# Patient Record
Sex: MALE | Race: BLACK OR AFRICAN AMERICAN | NOT HISPANIC OR LATINO | Employment: FULL TIME | URBAN - METROPOLITAN AREA
[De-identification: names, ages, dates, MRNs, and addresses within clinical notes are randomized per-mention and may not be internally consistent; named-entity substitution may affect disease eponyms.]

---

## 2021-04-12 ENCOUNTER — PATIENT MESSAGE (OUTPATIENT)
Dept: RESEARCH | Facility: HOSPITAL | Age: 41
End: 2021-04-12

## 2021-04-26 DIAGNOSIS — R20.2 PARESTHESIA OF RIGHT UPPER LIMB: Primary | ICD-10-CM

## 2021-05-12 ENCOUNTER — DOCUMENTATION ONLY (OUTPATIENT)
Dept: NEUROLOGY | Facility: CLINIC | Age: 41
End: 2021-05-12

## 2021-06-02 ENCOUNTER — HOSPITAL ENCOUNTER (EMERGENCY)
Facility: HOSPITAL | Age: 41
Discharge: HOME | End: 2021-06-02
Attending: EMERGENCY MEDICINE
Payer: MEDICAID

## 2021-06-02 VITALS
TEMPERATURE: 99 F | OXYGEN SATURATION: 98 % | HEIGHT: 75 IN | SYSTOLIC BLOOD PRESSURE: 133 MMHG | HEART RATE: 101 BPM | RESPIRATION RATE: 16 BRPM | DIASTOLIC BLOOD PRESSURE: 96 MMHG | WEIGHT: 315 LBS | BODY MASS INDEX: 39.17 KG/M2

## 2021-06-02 DIAGNOSIS — M54.50 ACUTE RIGHT-SIDED LOW BACK PAIN WITHOUT SCIATICA: Primary | ICD-10-CM

## 2021-06-02 PROCEDURE — 96372 THER/PROPH/DIAG INJ SC/IM: CPT

## 2021-06-02 PROCEDURE — 63600175 PHARM REV CODE 636 W HCPCS: Performed by: EMERGENCY MEDICINE

## 2021-06-02 PROCEDURE — 99284 EMERGENCY DEPT VISIT MOD MDM: CPT | Mod: 25

## 2021-06-02 RX ORDER — METHOCARBAMOL 500 MG/1
1000 TABLET, FILM COATED ORAL 3 TIMES DAILY
Qty: 30 TABLET | Refills: 0 | Status: SHIPPED | OUTPATIENT
Start: 2021-06-02 | End: 2021-06-02 | Stop reason: SDUPTHER

## 2021-06-02 RX ORDER — HYDROMORPHONE HYDROCHLORIDE 2 MG/ML
2 INJECTION, SOLUTION INTRAMUSCULAR; INTRAVENOUS; SUBCUTANEOUS
Status: COMPLETED | OUTPATIENT
Start: 2021-06-02 | End: 2021-06-02

## 2021-06-02 RX ORDER — METHOCARBAMOL 500 MG/1
1000 TABLET, FILM COATED ORAL 3 TIMES DAILY
Qty: 30 TABLET | Refills: 0 | Status: SHIPPED | OUTPATIENT
Start: 2021-06-02 | End: 2021-06-07

## 2021-06-02 RX ORDER — IBUPROFEN 800 MG/1
800 TABLET ORAL EVERY 6 HOURS PRN
Qty: 20 TABLET | Refills: 0 | Status: SHIPPED | OUTPATIENT
Start: 2021-06-02 | End: 2021-06-02 | Stop reason: SDUPTHER

## 2021-06-02 RX ORDER — KETOROLAC TROMETHAMINE 30 MG/ML
10 INJECTION, SOLUTION INTRAMUSCULAR; INTRAVENOUS
Status: COMPLETED | OUTPATIENT
Start: 2021-06-02 | End: 2021-06-02

## 2021-06-02 RX ORDER — IBUPROFEN 800 MG/1
800 TABLET ORAL EVERY 6 HOURS PRN
Qty: 20 TABLET | Refills: 0 | Status: SHIPPED | OUTPATIENT
Start: 2021-06-02

## 2021-06-02 RX ORDER — DEXAMETHASONE SODIUM PHOSPHATE 4 MG/ML
12 INJECTION, SOLUTION INTRA-ARTICULAR; INTRALESIONAL; INTRAMUSCULAR; INTRAVENOUS; SOFT TISSUE
Status: COMPLETED | OUTPATIENT
Start: 2021-06-02 | End: 2021-06-02

## 2021-06-02 RX ADMIN — HYDROMORPHONE HYDROCHLORIDE 2 MG: 2 INJECTION INTRAMUSCULAR; INTRAVENOUS; SUBCUTANEOUS at 10:06

## 2021-06-02 RX ADMIN — DEXAMETHASONE SODIUM PHOSPHATE 12 MG: 4 INJECTION, SOLUTION INTRA-ARTICULAR; INTRALESIONAL; INTRAMUSCULAR; INTRAVENOUS; SOFT TISSUE at 10:06

## 2021-06-02 RX ADMIN — KETOROLAC TROMETHAMINE 10 MG: 30 INJECTION, SOLUTION INTRAMUSCULAR at 10:06

## 2021-06-15 ENCOUNTER — HOSPITAL ENCOUNTER (EMERGENCY)
Facility: HOSPITAL | Age: 41
Discharge: HOME | End: 2021-06-15
Attending: EMERGENCY MEDICINE
Payer: MEDICAID

## 2021-06-15 VITALS
TEMPERATURE: 98 F | BODY MASS INDEX: 39.17 KG/M2 | WEIGHT: 315 LBS | HEART RATE: 98 BPM | HEIGHT: 75 IN | OXYGEN SATURATION: 97 % | SYSTOLIC BLOOD PRESSURE: 133 MMHG | DIASTOLIC BLOOD PRESSURE: 83 MMHG | RESPIRATION RATE: 18 BRPM

## 2021-06-15 DIAGNOSIS — R20.2 PARESTHESIAS: Primary | ICD-10-CM

## 2021-06-15 DIAGNOSIS — M54.10 RADICULOPATHY, UNSPECIFIED SPINAL REGION: ICD-10-CM

## 2021-06-15 LAB — POCT GLUCOSE: 83 MG/DL (ref 70–110)

## 2021-06-15 PROCEDURE — 96372 THER/PROPH/DIAG INJ SC/IM: CPT

## 2021-06-15 PROCEDURE — 63600175 PHARM REV CODE 636 W HCPCS: Performed by: PHYSICIAN ASSISTANT

## 2021-06-15 PROCEDURE — 99284 EMERGENCY DEPT VISIT MOD MDM: CPT | Mod: 25

## 2021-06-15 RX ORDER — KETOROLAC TROMETHAMINE 30 MG/ML
30 INJECTION, SOLUTION INTRAMUSCULAR; INTRAVENOUS
Status: COMPLETED | OUTPATIENT
Start: 2021-06-15 | End: 2021-06-15

## 2021-06-15 RX ADMIN — KETOROLAC TROMETHAMINE 30 MG: 30 INJECTION, SOLUTION INTRAMUSCULAR; INTRAVENOUS at 09:06

## 2021-06-16 ENCOUNTER — TELEPHONE (OUTPATIENT)
Dept: EMERGENCY MEDICINE | Facility: HOSPITAL | Age: 41
End: 2021-06-16

## 2021-10-23 ENCOUNTER — HOSPITAL ENCOUNTER (EMERGENCY)
Facility: HOSPITAL | Age: 41
Discharge: HOME | End: 2021-10-23
Attending: EMERGENCY MEDICINE
Payer: MEDICAID

## 2021-10-23 VITALS
HEART RATE: 80 BPM | HEIGHT: 75 IN | SYSTOLIC BLOOD PRESSURE: 133 MMHG | BODY MASS INDEX: 39.17 KG/M2 | WEIGHT: 315 LBS | OXYGEN SATURATION: 98 % | DIASTOLIC BLOOD PRESSURE: 76 MMHG | TEMPERATURE: 98 F | RESPIRATION RATE: 18 BRPM

## 2021-10-23 DIAGNOSIS — N18.9 ACUTE ON CHRONIC RENAL INSUFFICIENCY: ICD-10-CM

## 2021-10-23 DIAGNOSIS — M54.12 CERVICAL RADICULOPATHY: Primary | ICD-10-CM

## 2021-10-23 DIAGNOSIS — M62.82 NON-TRAUMATIC RHABDOMYOLYSIS: ICD-10-CM

## 2021-10-23 DIAGNOSIS — N28.9 ACUTE ON CHRONIC RENAL INSUFFICIENCY: ICD-10-CM

## 2021-10-23 DIAGNOSIS — N17.9 AKI (ACUTE KIDNEY INJURY): ICD-10-CM

## 2021-10-23 DIAGNOSIS — M50.30 DDD (DEGENERATIVE DISC DISEASE), CERVICAL: ICD-10-CM

## 2021-10-23 LAB
ANION GAP SERPL CALC-SCNC: 14 MMOL/L (ref 8–16)
BASOPHILS # BLD AUTO: 0.06 K/UL (ref 0–0.2)
BASOPHILS NFR BLD: 0.5 % (ref 0–1.9)
BUN SERPL-MCNC: 14 MG/DL (ref 6–20)
BUN SERPL-MCNC: 16 MG/DL (ref 6–30)
CALCIUM SERPL-MCNC: 10.4 MG/DL (ref 8.7–10.5)
CHLORIDE SERPL-SCNC: 100 MMOL/L (ref 95–110)
CHLORIDE SERPL-SCNC: 101 MMOL/L (ref 95–110)
CK SERPL-CCNC: 2203 U/L (ref 20–200)
CO2 SERPL-SCNC: 23 MMOL/L (ref 23–29)
CREAT SERPL-MCNC: 1.7 MG/DL (ref 0.5–1.4)
CREAT SERPL-MCNC: 1.8 MG/DL (ref 0.5–1.4)
DIFFERENTIAL METHOD: ABNORMAL
EOSINOPHIL # BLD AUTO: 0.1 K/UL (ref 0–0.5)
EOSINOPHIL NFR BLD: 0.9 % (ref 0–8)
ERYTHROCYTE [DISTWIDTH] IN BLOOD BY AUTOMATED COUNT: 13.9 % (ref 11.5–14.5)
EST. GFR  (AFRICAN AMERICAN): 56.6 ML/MIN/1.73 M^2
EST. GFR  (NON AFRICAN AMERICAN): 49 ML/MIN/1.73 M^2
GLUCOSE SERPL-MCNC: 104 MG/DL (ref 70–110)
GLUCOSE SERPL-MCNC: 98 MG/DL (ref 70–110)
HCT VFR BLD AUTO: 44.2 % (ref 40–54)
HCT VFR BLD CALC: 48 %PCV (ref 36–54)
HGB BLD-MCNC: 14.8 G/DL (ref 14–18)
IMM GRANULOCYTES # BLD AUTO: 0.03 K/UL (ref 0–0.04)
IMM GRANULOCYTES NFR BLD AUTO: 0.2 % (ref 0–0.5)
LYMPHOCYTES # BLD AUTO: 3 K/UL (ref 1–4.8)
LYMPHOCYTES NFR BLD: 23.7 % (ref 18–48)
MCH RBC QN AUTO: 28.9 PG (ref 27–31)
MCHC RBC AUTO-ENTMCNC: 33.5 G/DL (ref 32–36)
MCV RBC AUTO: 86 FL (ref 82–98)
MONOCYTES # BLD AUTO: 0.7 K/UL (ref 0.3–1)
MONOCYTES NFR BLD: 5.5 % (ref 4–15)
NEUTROPHILS # BLD AUTO: 8.7 K/UL (ref 1.8–7.7)
NEUTROPHILS NFR BLD: 69.2 % (ref 38–73)
NRBC BLD-RTO: 0 /100 WBC
PLATELET # BLD AUTO: 325 K/UL (ref 150–450)
PMV BLD AUTO: 10.8 FL (ref 9.2–12.9)
POC IONIZED CALCIUM: 1.29 MMOL/L (ref 1.06–1.42)
POC TCO2 (MEASURED): 24 MMOL/L (ref 23–29)
POTASSIUM BLD-SCNC: 4.4 MMOL/L (ref 3.5–5.1)
POTASSIUM SERPL-SCNC: 4.4 MMOL/L (ref 3.5–5.1)
RBC # BLD AUTO: 5.12 M/UL (ref 4.6–6.2)
SAMPLE: ABNORMAL
SODIUM BLD-SCNC: 139 MMOL/L (ref 136–145)
SODIUM SERPL-SCNC: 137 MMOL/L (ref 136–145)
WBC # BLD AUTO: 12.54 K/UL (ref 3.9–12.7)

## 2021-10-23 PROCEDURE — 82550 ASSAY OF CK (CPK): CPT | Performed by: PHYSICIAN ASSISTANT

## 2021-10-23 PROCEDURE — 99285 EMERGENCY DEPT VISIT HI MDM: CPT | Mod: ,,, | Performed by: PHYSICIAN ASSISTANT

## 2021-10-23 PROCEDURE — 96361 HYDRATE IV INFUSION ADD-ON: CPT

## 2021-10-23 PROCEDURE — 80048 BASIC METABOLIC PNL TOTAL CA: CPT | Performed by: PHYSICIAN ASSISTANT

## 2021-10-23 PROCEDURE — 96360 HYDRATION IV INFUSION INIT: CPT

## 2021-10-23 PROCEDURE — 99285 PR EMERGENCY DEPT VISIT,LEVEL V: ICD-10-PCS | Mod: ,,, | Performed by: PHYSICIAN ASSISTANT

## 2021-10-23 PROCEDURE — 63600175 PHARM REV CODE 636 W HCPCS: Performed by: PHYSICIAN ASSISTANT

## 2021-10-23 PROCEDURE — 25000003 PHARM REV CODE 250: Performed by: PHYSICIAN ASSISTANT

## 2021-10-23 PROCEDURE — 99285 EMERGENCY DEPT VISIT HI MDM: CPT | Mod: 25

## 2021-10-23 PROCEDURE — 80047 BASIC METABLC PNL IONIZED CA: CPT

## 2021-10-23 PROCEDURE — 85025 COMPLETE CBC W/AUTO DIFF WBC: CPT | Performed by: PHYSICIAN ASSISTANT

## 2021-10-23 RX ORDER — METHYLPREDNISOLONE 4 MG/1
TABLET ORAL
Qty: 21 EACH | Refills: 0 | Status: SHIPPED | OUTPATIENT
Start: 2021-10-23 | End: 2021-11-13

## 2021-10-23 RX ORDER — METHYLPREDNISOLONE 4 MG/1
TABLET ORAL
Qty: 21 EACH | Refills: 0 | Status: SHIPPED | OUTPATIENT
Start: 2021-10-23 | End: 2021-10-23 | Stop reason: SDUPTHER

## 2021-10-23 RX ADMIN — SODIUM CHLORIDE 1000 ML: 0.9 INJECTION, SOLUTION INTRAVENOUS at 05:10

## 2021-10-23 RX ADMIN — SODIUM CHLORIDE, SODIUM LACTATE, POTASSIUM CHLORIDE, AND CALCIUM CHLORIDE 1000 ML: .6; .31; .03; .02 INJECTION, SOLUTION INTRAVENOUS at 07:10

## 2021-10-26 ENCOUNTER — OFFICE VISIT (OUTPATIENT)
Dept: SPINE | Facility: CLINIC | Age: 41
End: 2021-10-26
Payer: MEDICAID

## 2021-10-26 VITALS — BODY MASS INDEX: 39.17 KG/M2 | WEIGHT: 315 LBS | HEIGHT: 75 IN

## 2021-10-26 DIAGNOSIS — M54.10 RADICULOPATHY, UNSPECIFIED SPINAL REGION: ICD-10-CM

## 2021-10-26 DIAGNOSIS — M54.50 CHRONIC BILATERAL LOW BACK PAIN WITHOUT SCIATICA: ICD-10-CM

## 2021-10-26 DIAGNOSIS — M54.12 CERVICAL RADICULITIS: Primary | ICD-10-CM

## 2021-10-26 DIAGNOSIS — G89.29 CHRONIC BILATERAL LOW BACK PAIN WITHOUT SCIATICA: ICD-10-CM

## 2021-10-26 PROCEDURE — 99204 PR OFFICE/OUTPT VISIT, NEW, LEVL IV, 45-59 MIN: ICD-10-PCS | Mod: S$GLB,,, | Performed by: PHYSICAL MEDICINE & REHABILITATION

## 2021-10-26 PROCEDURE — 99204 OFFICE O/P NEW MOD 45 MIN: CPT | Mod: S$GLB,,, | Performed by: PHYSICAL MEDICINE & REHABILITATION

## 2021-12-06 ENCOUNTER — TELEPHONE (OUTPATIENT)
Dept: SPINE | Facility: CLINIC | Age: 41
End: 2021-12-06
Payer: MEDICAID

## 2021-12-22 ENCOUNTER — PATIENT MESSAGE (OUTPATIENT)
Dept: ADMINISTRATIVE | Facility: CLINIC | Age: 41
End: 2021-12-22
Payer: MEDICAID

## 2021-12-22 ENCOUNTER — OFFICE VISIT (OUTPATIENT)
Dept: URGENT CARE | Facility: CLINIC | Age: 41
End: 2021-12-22
Payer: MEDICAID

## 2021-12-22 VITALS
OXYGEN SATURATION: 97 % | HEIGHT: 75 IN | RESPIRATION RATE: 20 BRPM | SYSTOLIC BLOOD PRESSURE: 150 MMHG | DIASTOLIC BLOOD PRESSURE: 101 MMHG | BODY MASS INDEX: 39.17 KG/M2 | TEMPERATURE: 98 F | HEART RATE: 90 BPM | WEIGHT: 315 LBS

## 2021-12-22 DIAGNOSIS — U07.1 COVID-19 VIRUS DETECTED: ICD-10-CM

## 2021-12-22 DIAGNOSIS — U07.1 COVID-19 VIRUS INFECTION: ICD-10-CM

## 2021-12-22 DIAGNOSIS — R05.9 COUGH: Primary | ICD-10-CM

## 2021-12-22 LAB
CTP QC/QA: YES
CTP QC/QA: YES
FLUAV AG NPH QL: NEGATIVE
FLUBV AG NPH QL: NEGATIVE
SARS-COV-2 RDRP RESP QL NAA+PROBE: POSITIVE

## 2021-12-22 PROCEDURE — U0002: ICD-10-PCS | Mod: QW,S$GLB,, | Performed by: NURSE PRACTITIONER

## 2021-12-22 PROCEDURE — 87804 POCT INFLUENZA A/B: ICD-10-PCS | Mod: QW,,, | Performed by: NURSE PRACTITIONER

## 2021-12-22 PROCEDURE — U0002 COVID-19 LAB TEST NON-CDC: HCPCS | Mod: QW,S$GLB,, | Performed by: NURSE PRACTITIONER

## 2021-12-22 PROCEDURE — 99204 OFFICE O/P NEW MOD 45 MIN: CPT | Mod: S$GLB,,, | Performed by: NURSE PRACTITIONER

## 2021-12-22 PROCEDURE — 1159F MED LIST DOCD IN RCRD: CPT | Mod: CPTII,S$GLB,, | Performed by: NURSE PRACTITIONER

## 2021-12-22 PROCEDURE — 1159F PR MEDICATION LIST DOCUMENTED IN MEDICAL RECORD: ICD-10-PCS | Mod: CPTII,S$GLB,, | Performed by: NURSE PRACTITIONER

## 2021-12-22 PROCEDURE — 1160F RVW MEDS BY RX/DR IN RCRD: CPT | Mod: CPTII,S$GLB,, | Performed by: NURSE PRACTITIONER

## 2021-12-22 PROCEDURE — 87804 INFLUENZA ASSAY W/OPTIC: CPT | Mod: QW,,, | Performed by: NURSE PRACTITIONER

## 2021-12-22 PROCEDURE — 3008F PR BODY MASS INDEX (BMI) DOCUMENTED: ICD-10-PCS | Mod: CPTII,S$GLB,, | Performed by: NURSE PRACTITIONER

## 2021-12-22 PROCEDURE — 99204 PR OFFICE/OUTPT VISIT, NEW, LEVL IV, 45-59 MIN: ICD-10-PCS | Mod: S$GLB,,, | Performed by: NURSE PRACTITIONER

## 2021-12-22 PROCEDURE — 1160F PR REVIEW ALL MEDS BY PRESCRIBER/CLIN PHARMACIST DOCUMENTED: ICD-10-PCS | Mod: CPTII,S$GLB,, | Performed by: NURSE PRACTITIONER

## 2021-12-22 PROCEDURE — 3008F BODY MASS INDEX DOCD: CPT | Mod: CPTII,S$GLB,, | Performed by: NURSE PRACTITIONER

## 2021-12-22 RX ORDER — ALBUTEROL SULFATE 90 UG/1
2 AEROSOL, METERED RESPIRATORY (INHALATION) EVERY 6 HOURS PRN
Qty: 18 G | Refills: 0 | Status: SHIPPED | OUTPATIENT
Start: 2021-12-22

## 2021-12-22 RX ORDER — PROMETHAZINE HYDROCHLORIDE AND DEXTROMETHORPHAN HYDROBROMIDE 6.25; 15 MG/5ML; MG/5ML
5 SYRUP ORAL EVERY 4 HOURS PRN
Qty: 118 ML | Refills: 0 | Status: SHIPPED | OUTPATIENT
Start: 2021-12-22 | End: 2022-01-01

## 2021-12-22 RX ORDER — ONDANSETRON 4 MG/1
8 TABLET, ORALLY DISINTEGRATING ORAL EVERY 8 HOURS PRN
Qty: 20 TABLET | Refills: 0 | Status: SHIPPED | OUTPATIENT
Start: 2021-12-22

## 2021-12-22 RX ORDER — FLUTICASONE PROPIONATE 50 MCG
1 SPRAY, SUSPENSION (ML) NASAL DAILY
Qty: 15.8 ML | Refills: 0 | Status: SHIPPED | OUTPATIENT
Start: 2021-12-22

## 2021-12-22 RX ORDER — CETIRIZINE HYDROCHLORIDE 10 MG/1
10 TABLET ORAL DAILY
Qty: 30 TABLET | Refills: 0 | Status: SHIPPED | OUTPATIENT
Start: 2021-12-22 | End: 2022-01-21

## 2021-12-22 RX ORDER — BENZONATATE 100 MG/1
100 CAPSULE ORAL 3 TIMES DAILY PRN
Qty: 30 CAPSULE | Refills: 0 | Status: SHIPPED | OUTPATIENT
Start: 2021-12-22 | End: 2022-01-01

## 2021-12-23 ENCOUNTER — PATIENT MESSAGE (OUTPATIENT)
Dept: ADMINISTRATIVE | Facility: CLINIC | Age: 41
End: 2021-12-23
Payer: MEDICAID

## 2021-12-23 ENCOUNTER — PATIENT MESSAGE (OUTPATIENT)
Dept: ADMINISTRATIVE | Facility: OTHER | Age: 41
End: 2021-12-23
Payer: MEDICAID

## 2021-12-23 ENCOUNTER — TELEPHONE (OUTPATIENT)
Dept: ADMINISTRATIVE | Facility: CLINIC | Age: 41
End: 2021-12-23
Payer: MEDICAID

## 2021-12-24 ENCOUNTER — PATIENT MESSAGE (OUTPATIENT)
Dept: ADMINISTRATIVE | Facility: CLINIC | Age: 41
End: 2021-12-24
Payer: MEDICAID

## 2021-12-25 ENCOUNTER — PATIENT MESSAGE (OUTPATIENT)
Dept: ADMINISTRATIVE | Facility: CLINIC | Age: 41
End: 2021-12-25
Payer: MEDICAID

## 2021-12-26 ENCOUNTER — PATIENT MESSAGE (OUTPATIENT)
Dept: ADMINISTRATIVE | Facility: CLINIC | Age: 41
End: 2021-12-26
Payer: MEDICAID

## 2022-06-09 ENCOUNTER — HOSPITAL ENCOUNTER (EMERGENCY)
Facility: HOSPITAL | Age: 42
Discharge: LEFT BEFORE DISCHARGE | End: 2022-06-09
Payer: MEDICAID

## 2022-06-09 VITALS
WEIGHT: 315 LBS | DIASTOLIC BLOOD PRESSURE: 88 MMHG | SYSTOLIC BLOOD PRESSURE: 168 MMHG | HEART RATE: 100 BPM | TEMPERATURE: 98 F | RESPIRATION RATE: 20 BRPM | BODY MASS INDEX: 39.17 KG/M2 | HEIGHT: 75 IN

## 2022-06-09 DIAGNOSIS — R07.9 CHEST PAIN: ICD-10-CM

## 2022-06-09 PROCEDURE — 99900041 HC LEFT WITHOUT BEING SEEN- EMERGENCY

## 2022-06-09 PROCEDURE — 93005 ELECTROCARDIOGRAM TRACING: CPT

## 2022-06-09 PROCEDURE — 93010 EKG 12-LEAD: ICD-10-PCS | Mod: ,,, | Performed by: INTERNAL MEDICINE

## 2022-06-09 PROCEDURE — 93010 ELECTROCARDIOGRAM REPORT: CPT | Mod: ,,, | Performed by: INTERNAL MEDICINE

## 2023-04-04 ENCOUNTER — PATIENT MESSAGE (OUTPATIENT)
Dept: RESEARCH | Facility: HOSPITAL | Age: 43
End: 2023-04-04
Payer: MEDICAID

## 2024-10-22 ENCOUNTER — HOSPITAL ENCOUNTER (EMERGENCY)
Facility: HOSPITAL | Age: 44
Discharge: HOME | End: 2024-10-22
Attending: STUDENT IN AN ORGANIZED HEALTH CARE EDUCATION/TRAINING PROGRAM
Payer: MEDICAID

## 2024-10-22 VITALS
SYSTOLIC BLOOD PRESSURE: 145 MMHG | RESPIRATION RATE: 18 BRPM | BODY MASS INDEX: 36.06 KG/M2 | TEMPERATURE: 98 F | OXYGEN SATURATION: 98 % | HEIGHT: 75 IN | WEIGHT: 290 LBS | DIASTOLIC BLOOD PRESSURE: 88 MMHG | HEART RATE: 75 BPM

## 2024-10-22 DIAGNOSIS — K62.5 RECTAL BLEEDING: Primary | ICD-10-CM

## 2024-10-22 LAB
ABO + RH BLD: NORMAL
ALBUMIN SERPL BCP-MCNC: 4.2 G/DL (ref 3.5–5.2)
ALP SERPL-CCNC: 98 U/L (ref 40–150)
ALT SERPL W/O P-5'-P-CCNC: 29 U/L (ref 10–44)
ANION GAP SERPL CALC-SCNC: 13 MMOL/L (ref 8–16)
AST SERPL-CCNC: 35 U/L (ref 10–40)
BASOPHILS # BLD AUTO: 0.11 K/UL (ref 0–0.2)
BASOPHILS NFR BLD: 1.2 % (ref 0–1.9)
BILIRUB SERPL-MCNC: 0.2 MG/DL (ref 0.1–1)
BLD GP AB SCN CELLS X3 SERPL QL: NORMAL
BUN SERPL-MCNC: 14 MG/DL (ref 6–20)
CALCIUM SERPL-MCNC: 9.8 MG/DL (ref 8.7–10.5)
CHLORIDE SERPL-SCNC: 107 MMOL/L (ref 95–110)
CO2 SERPL-SCNC: 20 MMOL/L (ref 23–29)
CREAT SERPL-MCNC: 1.6 MG/DL (ref 0.5–1.4)
DIFFERENTIAL METHOD BLD: ABNORMAL
EOSINOPHIL # BLD AUTO: 0.2 K/UL (ref 0–0.5)
EOSINOPHIL NFR BLD: 2.5 % (ref 0–8)
ERYTHROCYTE [DISTWIDTH] IN BLOOD BY AUTOMATED COUNT: 13.5 % (ref 11.5–14.5)
EST. GFR  (NO RACE VARIABLE): 54 ML/MIN/1.73 M^2
GLUCOSE SERPL-MCNC: 104 MG/DL (ref 70–110)
HCT VFR BLD AUTO: 42.2 % (ref 40–54)
HGB BLD-MCNC: 13.7 G/DL (ref 14–18)
IMM GRANULOCYTES # BLD AUTO: 0.02 K/UL (ref 0–0.04)
IMM GRANULOCYTES NFR BLD AUTO: 0.2 % (ref 0–0.5)
LYMPHOCYTES # BLD AUTO: 2.9 K/UL (ref 1–4.8)
LYMPHOCYTES NFR BLD: 31.4 % (ref 18–48)
MCH RBC QN AUTO: 28.4 PG (ref 27–31)
MCHC RBC AUTO-ENTMCNC: 32.5 G/DL (ref 32–36)
MCV RBC AUTO: 88 FL (ref 82–98)
MONOCYTES # BLD AUTO: 0.6 K/UL (ref 0.3–1)
MONOCYTES NFR BLD: 6.6 % (ref 4–15)
NEUTROPHILS # BLD AUTO: 5.4 K/UL (ref 1.8–7.7)
NEUTROPHILS NFR BLD: 58.1 % (ref 38–73)
NRBC BLD-RTO: 0 /100 WBC
PLATELET # BLD AUTO: 292 K/UL (ref 150–450)
PMV BLD AUTO: 10.6 FL (ref 9.2–12.9)
POTASSIUM SERPL-SCNC: 4.4 MMOL/L (ref 3.5–5.1)
PROT SERPL-MCNC: 8.1 G/DL (ref 6–8.4)
RBC # BLD AUTO: 4.82 M/UL (ref 4.6–6.2)
SODIUM SERPL-SCNC: 140 MMOL/L (ref 136–145)
SPECIMEN OUTDATE: NORMAL
WBC # BLD AUTO: 9.35 K/UL (ref 3.9–12.7)

## 2024-10-22 PROCEDURE — 86901 BLOOD TYPING SEROLOGIC RH(D): CPT | Performed by: NURSE PRACTITIONER

## 2024-10-22 PROCEDURE — 86850 RBC ANTIBODY SCREEN: CPT | Performed by: NURSE PRACTITIONER

## 2024-10-22 PROCEDURE — 99283 EMERGENCY DEPT VISIT LOW MDM: CPT | Mod: 25

## 2024-10-22 PROCEDURE — 80053 COMPREHEN METABOLIC PANEL: CPT | Performed by: NURSE PRACTITIONER

## 2024-10-22 PROCEDURE — 85025 COMPLETE CBC W/AUTO DIFF WBC: CPT | Performed by: NURSE PRACTITIONER

## 2024-10-22 PROCEDURE — 36415 COLL VENOUS BLD VENIPUNCTURE: CPT | Performed by: NURSE PRACTITIONER

## 2024-11-13 ENCOUNTER — PATIENT MESSAGE (OUTPATIENT)
Dept: RESEARCH | Facility: HOSPITAL | Age: 44
End: 2024-11-13
Payer: MEDICAID

## 2024-12-09 ENCOUNTER — TELEPHONE (OUTPATIENT)
Dept: PRIMARY CARE CLINIC | Facility: CLINIC | Age: 44
End: 2024-12-09
Payer: MEDICAID

## 2024-12-15 ENCOUNTER — OFFICE VISIT (OUTPATIENT)
Dept: URGENT CARE | Facility: CLINIC | Age: 44
End: 2024-12-15
Payer: MEDICAID

## 2024-12-15 VITALS
SYSTOLIC BLOOD PRESSURE: 136 MMHG | WEIGHT: 275 LBS | BODY MASS INDEX: 34.19 KG/M2 | TEMPERATURE: 98 F | HEIGHT: 75 IN | RESPIRATION RATE: 18 BRPM | HEART RATE: 67 BPM | OXYGEN SATURATION: 96 % | DIASTOLIC BLOOD PRESSURE: 89 MMHG

## 2024-12-15 DIAGNOSIS — R53.83 FATIGUE, UNSPECIFIED TYPE: Primary | ICD-10-CM

## 2024-12-15 PROCEDURE — 99213 OFFICE O/P EST LOW 20 MIN: CPT | Mod: S$GLB,,,

## 2024-12-15 NOTE — PROGRESS NOTES
"Subjective:      Patient ID: Hiren Hwang is a 44 y.o. male.    Vitals:  height is 6' 3" (1.905 m) and weight is 124.7 kg (275 lb). His oral temperature is 98.4 °F (36.9 °C). His blood pressure is 136/89 and his pulse is 67. His respiration is 18 and oxygen saturation is 96%.     Chief Complaint: Fatigue    44 y.o. male presents for evaluation of fatigue and generalized weakness. He reports a stressful work and personal life. He reports he is physically active and works out almost daily. He reports a healthy diet. We discussed he needs to follow up with a primary care provider. He denies focal weakness, no noted neurological deficits noted on exam. NIH: 0. No abnormality noted on physical exam, vitals are stable. Patient is ambulatory and stable in clinic.     Fatigue  This is a new problem. The current episode started today. The problem occurs constantly. Associated symptoms include fatigue and weakness. Pertinent negatives include no chills, headaches or numbness. Nothing aggravates the symptoms. He has tried nothing for the symptoms.       Constitution: Positive for fatigue and generalized weakness. Negative for chills.   HENT: Negative.     Respiratory: Negative.     Neurological:  Negative for light-headedness, speech difficulty, coordination disturbances, headaches, disorientation, altered mental status, numbness and tingling.   Psychiatric/Behavioral:  Negative for altered mental status, disorientation, confusion, agitation, nervous/anxious and depression. The patient is not nervous/anxious.       Objective:     Physical Exam   Constitutional: He is oriented to person, place, and time. normal  HENT:   Head: Normocephalic and atraumatic.   Ears:   Right Ear: External ear normal.   Left Ear: External ear normal.   Nose: Nose normal.   Mouth/Throat: Mucous membranes are moist.   Eyes: Conjunctivae are normal.   Cardiovascular: Normal rate.   Pulmonary/Chest: Effort normal. No respiratory distress. "   Abdominal: Normal appearance.   Musculoskeletal: Normal range of motion.         General: No swelling or tenderness. Normal range of motion.   Neurological: He is alert and oriented to person, place, and time. He displays no weakness, facial symmetry and no dysarthria. GCS eye subscore is 4. GCS verbal subscore is 5. GCS motor subscore is 6.   Skin: Skin is warm and dry.   Psychiatric: His behavior is normal. Mood, judgment and thought content normal.   Nursing note and vitals reviewed.      Assessment:     1. Fatigue, unspecified type        Plan:       Fatigue, unspecified type  -     Ambulatory referral/consult to Family Practice      Discussed medication with patient who acknowledges understanding and is agreeable to POC. Follow up with primary care. Increase fluid intake. Red flags for ER discussed.

## 2025-03-02 ENCOUNTER — OFFICE VISIT (OUTPATIENT)
Dept: URGENT CARE | Facility: CLINIC | Age: 45
End: 2025-03-02
Payer: COMMERCIAL

## 2025-03-02 VITALS
RESPIRATION RATE: 20 BRPM | WEIGHT: 275 LBS | BODY MASS INDEX: 34.19 KG/M2 | DIASTOLIC BLOOD PRESSURE: 89 MMHG | HEIGHT: 75 IN | TEMPERATURE: 98 F | HEART RATE: 72 BPM | OXYGEN SATURATION: 98 % | SYSTOLIC BLOOD PRESSURE: 133 MMHG

## 2025-03-02 DIAGNOSIS — R53.83 FATIGUE, UNSPECIFIED TYPE: ICD-10-CM

## 2025-03-02 DIAGNOSIS — M25.529 ARTHRALGIA OF ELBOW, UNSPECIFIED LATERALITY: Primary | ICD-10-CM

## 2025-03-02 PROCEDURE — 99214 OFFICE O/P EST MOD 30 MIN: CPT | Mod: S$GLB,,, | Performed by: NURSE PRACTITIONER

## 2025-03-02 RX ORDER — NAPROXEN 500 MG/1
500 TABLET ORAL 2 TIMES DAILY WITH MEALS
Qty: 14 TABLET | Refills: 0 | Status: SHIPPED | OUTPATIENT
Start: 2025-03-02 | End: 2025-03-09

## 2025-03-02 NOTE — PATIENT INSTRUCTIONS
Make an appt with PCP given the contact information for Dr. Idris Johnson in [place]      Basic labs with a uric acid [facility], call patient with results

## 2025-03-02 NOTE — PROGRESS NOTES
"Subjective:      Patient ID: Hiren Hwang is a 45 y.o. male.    Vitals:  height is 6' 3" (1.905 m) and weight is 124.7 kg (275 lb). His oral temperature is 98.2 °F (36.8 °C). His blood pressure is 133/89 and his pulse is 72. His respiration is 20 and oxygen saturation is 98%.     Chief Complaint: Joint Pain    45-year-old male seen today with reports of fatigue and joint pain for the past 2 weeks.  Patient denies any fever, night sweats, diarrhea, joint swelling, or joint redness.  He does not have a primary care provider.  Patient attributes his discomfort and fatigue due to a new job that requires more physical exertion than he is used to.  Patient reports having a history of degenerative disc disease of his lumbar spine in reports his back gives out sometimes.  Currently he reports pain in his lower back that does does radiate down his right lower extremity for the past several weeks.  He also reports having pain in his elbows and his knees.    Joint Pain  This is a new problem. The current episode started yesterday. The problem occurs constantly. The problem has been unchanged. Associated symptoms include arthralgias. Pertinent negatives include no chills, diaphoresis, fatigue, fever, joint swelling or myalgias. The symptoms are aggravated by bending. He has tried acetaminophen for the symptoms. The treatment provided no relief.       Constitution: Negative for activity change, appetite change, chills, sweating, fatigue and fever.   HENT: Negative.     Neck: neck negative.   Cardiovascular: Negative.    Eyes: Negative.    Respiratory: Negative.     Gastrointestinal: Negative.    Endocrine: negative.   Genitourinary: Negative.    Musculoskeletal:  Positive for pain, joint pain, back pain and history of spine disorder. Negative for trauma, joint swelling, abnormal ROM of joint, arthritis, gout, pain with walking, muscle cramps and muscle ache.   Skin: Negative.  Negative for erythema.   Allergic/Immunologic: " Negative.    Neurological: Negative.    Hematologic/Lymphatic: Negative.    Psychiatric/Behavioral: Negative.        Objective:     Physical Exam   Constitutional: He is oriented to person, place, and time.  Non-toxic appearance. He does not appear ill. No distress. normal  HENT:   Head: Normocephalic and atraumatic.   Ears:   Right Ear: External ear and ear canal normal.   Left Ear: Tympanic membrane, external ear and ear canal normal.   Nose: Nose normal. No rhinorrhea or congestion.   Mouth/Throat: Mucous membranes are moist. No oropharyngeal exudate or posterior oropharyngeal erythema.   Eyes: Conjunctivae are normal. Pupils are equal, round, and reactive to light. Extraocular movement intact   Neck: Neck supple. No neck rigidity present.   Cardiovascular: Normal rate, regular rhythm, normal heart sounds and normal pulses.   Pulmonary/Chest: Effort normal and breath sounds normal. No stridor. No respiratory distress. He has no wheezes. He has no rhonchi. He has no rales.   Abdominal: Normal appearance and bowel sounds are normal. He exhibits no distension. Soft. There is no abdominal tenderness. There is guarding. There is no rebound. No hernia.   Musculoskeletal: Normal range of motion.         General: No swelling, tenderness, deformity or signs of injury. Normal range of motion.      Right lower leg: No edema.      Left lower leg: No edema.      Comments: No redness, swelling, tenderness, or warmth noted to the joints of patient's hands, fingers, wrists, elbows, knees, ankles, or feet.    Normal range of motion to the lumbar spine   Lymphadenopathy:     He has no cervical adenopathy.   Neurological: no focal deficit. He is alert, oriented to person, place, and time and at baseline. He displays no weakness and normal reflexes. No cranial nerve deficit or sensory deficit. Coordination and gait normal.   Skin: Skin is warm, dry, not diaphoretic, not pale and no rash. Capillary refill takes less than 2 seconds.  No bruising, No erythema and No lesion no jaundice  Psychiatric: His behavior is normal. Mood and thought content normal.       Assessment:     1. Arthralgia of elbow, unspecified laterality    2. Fatigue, unspecified type        Plan:       Arthralgia of elbow, unspecified laterality  -     URIC ACID; Future; Expected date: 03/03/2025  -     naproxen (NAPROSYN) 500 MG tablet; Take 1 tablet (500 mg total) by mouth 2 (two) times daily with meals. for 7 days  Dispense: 14 tablet; Refill: 0    Fatigue, unspecified type  -     CBC W/ AUTO DIFFERENTIAL; Future; Expected date: 03/03/2025  -     COMPREHENSIVE METABOLIC PANEL; Future; Expected date: 03/03/2025          Medical Decision Making:   Discussed with patient we will do basic labs such as a CBC a CMP and a uric acid level.  Recommend he make an appointment with a primary care provider to do a full evaluation of his symptoms.

## 2025-03-02 NOTE — LETTER
March 2, 2025      [facility]  [id number] [address] [id number]       Patient: Hiren Hwang   YOB: 1980  Date of Visit: 03/02/2025    To Whom It May Concern:    Brenda Hwang  was at [facility] on 03/02/2025. The patient may return to work/school on 03/06/2025 with no restrictions. If you have any questions or concerns, or if I can be of further assistance, please do not hesitate to contact me.    Sincerely,    Bandar Rangel MA